# Patient Record
(demographics unavailable — no encounter records)

---

## 2024-10-29 NOTE — PHYSICAL EXAM
[Alert] : alert [No Acute Distress] : no acute distress [Normocephalic] : normocephalic [Conjunctivae with no discharge] : conjunctivae with no discharge [PERRL] : PERRL [EOMI Bilateral] : EOMI bilateral [Auricles Well Formed] : auricles well formed [Clear Tympanic membranes with present light reflex and bony landmarks] : clear tympanic membranes with present light reflex and bony landmarks [No Discharge] : no discharge [Nares Patent] : nares patent [Pink Nasal Mucosa] : pink nasal mucosa [Palate Intact] : palate intact [Supple, full passive range of motion] : supple, full passive range of motion [No Palpable Masses] : no palpable masses [Symmetric Chest Rise] : symmetric chest rise [Clear to Auscultation Bilaterally] : clear to auscultation bilaterally [Regular Rate and Rhythm] : regular rate and rhythm [Normal S1, S2 present] : normal S1, S2 present [No Murmurs] : no murmurs [+2 Femoral Pulses] : +2 femoral pulses [Soft] : soft [NonTender] : non tender [Non Distended] : non distended [Normoactive Bowel Sounds] : normoactive bowel sounds [No Hepatomegaly] : no hepatomegaly [No Splenomegaly] : no splenomegaly [Testicles Descended Bilaterally] : testicles descended bilaterally [No Abnormal Lymph Nodes Palpated] : no abnormal lymph nodes palpated [Normal Muscle Tone] : normal muscle tone [Straight] : straight [No Scoliosis] : no scoliosis [Cranial Nerves Grossly Intact] : cranial nerves grossly intact [No Rash or Lesions] : no rash or lesions [de-identified] : red throat

## 2024-10-29 NOTE — DISCUSSION/SUMMARY
[] : The components of the vaccine(s) to be administered today are listed in the plan of care. The disease(s) for which the vaccine(s) are intended to prevent and the risks have been discussed with the caretaker.  The risks are also included in the appropriate vaccination information statements which have been provided to the patient's caregiver.  The caregiver has given consent to vaccinate. [FreeTextEntry1] : Continue balanced diet with all food groups. Brush teeth twice a day with toothbrush. Recommend visit to dentist. Help child to maintain consistent daily routines and sleep schedule. School discussed. Ensure home is safe. Teach child about personal safety. Use consistent, positive discipline. Limit screen time to no more than 2 hours per day. Encourage physical activity. Child needs to ride in a belt-positioning booster seat until  4 feet 9 inches has been reached and are between 8 and 12 years of age.   Reviewed 5-2-1-0 questionnaire Cardiology questionnaire reviewed Return 1 year for routine well child check. Throat cx if pos Amoxil  500 mg po  bid x 10 days Tylenol prn, fluids

## 2024-10-29 NOTE — HISTORY OF PRESENT ILLNESS
[Mother] : mother [Normal] : Normal [Yes] : Patient goes to dentist yearly [Toothpaste] : Primary Fluoride Source: Toothpaste [Grade ___] : Grade [unfilled] [No] : No cigarette smoke exposure [Up to date] : Up to date [FreeTextEntry7] : 8 year well visit.  ST x 1 day.  Takes Singulair in the spring, Flonase prn [FreeTextEntry9] : lacrosse, basketball, hockey

## 2025-03-14 NOTE — HISTORY OF PRESENT ILLNESS
[de-identified] : Mom reports sore throat x today at school. Afebrile per mom. Normal appetite and voiding. Denies vomiting or diarrhea. Denies OTC today. [FreeTextEntry6] : Just started getting runny nose today Denies cough

## 2025-03-14 NOTE — PLAN
[TextEntry] : ALLERGIC RHINITIS: - Recommended treatment: Zyrtec (Cetirizine) 5-10 mg once a day Flonase (Fluticasone) 1 spray(s) each nostril 1 time(s) a day - Follow up if symptoms persist or worsen  If throat cx + start amoxicillin 400/5 at 6.5mL BID x 10 days

## 2025-03-14 NOTE — PHYSICAL EXAM
[TextEntry] : General: alert and active in no apparent distress Eyes: conjunctiva clear, EOMI Ears: TMs translucent bilaterally, normal landmarks noted, normal canals Nose: clear rhinorrhea with BL pale, boggy turbinates, L>R OP: no tonsillar enlargement/exudate, no lesions, no posterior pharyngeal erythema Neck: supple, no adenopathy Lungs: clear to auscultation bilaterally, good air exchange, no retractions, comfortable WOB CVS: Normal rate, regular rhythm, no murmur Abdomen: soft, nondistended, nontender, and no hepatosplenomegaly or masses, normoactive bowel sounds Skin: No rashes, lesions or skin changes

## 2025-03-31 NOTE — PLAN
4 = No assist / stand by assistance
[TextEntry] : left acute otitis media, recommend amoxicillin 10ml BID x 10 days. finish full course of antibiotics, side effects reviewed.  encourage hydration and rest tylenol/motrin as needed for fevers and/or pain return to office in 2 weeks if no improvement or worsening symptoms

## 2025-03-31 NOTE — END OF VISIT
[FreeTextEntry3] : All medical record entries made by NP student Bertha Leblanc for this encounter were under the direction of myself. I was present with the entire encounter and have reviewed the chart and agree that the record accurately reflects my personal performance of the history, physical exam, assessment and plan. I have also personally directed, reviewed and agreed with the chart.

## 2025-03-31 NOTE — HISTORY OF PRESENT ILLNESS
[de-identified] : left ear pain that began last night as per dad, last dose of tylenol an hr ago [FreeTextEntry6] : left ear pain starting last night and right side of neck feeling swollen today headache this morning, tylenol given last at 11am no fevers, no difficulty swallowing able to tolerate PO, no nausea/vomiting no cough or congestion no recent sick contacts

## 2025-03-31 NOTE — PHYSICAL EXAM
[Clear] : right tympanic membrane clear [Erythematous Oropharynx] : erythematous oropharynx [Symmetric Chest Wall] : symmetric chest wall [NL] : warm, clear [Anterior Cervical] : anterior cervical [Submandibular] : submandibular [Tenderness] : no tenderness [Traumatic] : atraumatic [Vesicles] : no vesicles [Exudate] : no exudate [Palate petechiae] : palate without petechiae [Tenderness With Palpation of Chest Wall] : no tenderness with palpation of chest wall [FreeTextEntry3] : dull, erythematous left tympanic membrane [de-identified] : mildly enlarged

## 2025-04-29 NOTE — PHYSICAL EXAM
[TextEntry] : General: alert and active in no apparent distress Eyes: conjunctiva clear, EOMI Ears: left TM erythematous, dull, right TM translucent, normal landmarks noted, normal canals Nose: clear rhinorrhea OP: no tonsillar enlargement/exudate, no lesions, no posterior pharyngeal erythema Neck: supple, no adenopathy Lungs: clear to auscultation bilaterally, good air exchange, no retractions, comfortable WOB CVS: Normal rate, regular rhythm, no murmur Skin: No rashes, lesions or skin changes

## 2025-04-29 NOTE — HISTORY OF PRESENT ILLNESS
[de-identified] : 8yr old m c/o left ear ache runny nose on Zyrtec  [FreeTextEntry6] : In addition to above: left ear pain since last PM + runny nose and headache Denies fever, cough, ST, N/V/D Appetite nml

## 2025-04-29 NOTE — CARE PLAN
[Care Plan reviewed and provided to patient/caregiver] : Care plan reviewed and provided to patient/caregiver [Care Plan reviewed every ___ weeks] : Care plan reviewed every [unfilled] weeks [Care Plan managed/Care coordinated by: ___] : Care plan managed/Care coordinated by: [unfilled] [Understands and communicates without difficulty] : Patient/Caregiver understands and communicates without difficulty [FreeTextEntry2] : see plan [FreeTextEntry3] : see plan [place] Counseling: I discussed with the patient the risks of tildrakizumab including but not limited to immunosuppression, malignancy, posterior leukoencephalopathy syndrome, and serious infections.  The patient understands that monitoring is required including a PPD at baseline and must alert us or the primary physician if symptoms of infection or other concerning signs are noted.

## 2025-04-29 NOTE — PLAN
[TextEntry] :  OTITIS MEDIA: - Treat with medication per order - Symptomatic treatment with acetaminophen or ibuprofen prn - Continue Zyrtec and Flonase  - Follow up if symptoms are worsening

## 2025-05-12 NOTE — PHYSICAL EXAM
[NL] : warm, clear [Erythema] : no erythema [de-identified] : staple intact right side of scalp above right ear, no edema or discharge at site

## 2025-05-12 NOTE — REVIEW OF SYSTEMS
[Nasal Discharge] : nasal discharge [Cough] : cough [Negative] : Genitourinary [Fever] : no fever [Ear Pain] : no ear pain [Tachypnea] : not tachypneic [Wheezing] : no wheezing [Shortness of Breath] : no shortness of breath

## 2025-05-12 NOTE — PHYSICAL EXAM
[NL] : warm, clear [Erythema] : no erythema [de-identified] : staple intact right side of scalp above right ear, no edema or discharge at site

## 2025-05-12 NOTE — HISTORY OF PRESENT ILLNESS
[de-identified] :  [facility]25 and received a staple on side of head and per [facility] said he had yellow fluid in left ear. mom denies any other symptoms [FreeTextEntry6] : Seen at [facility] 3 days ago after sustaining laceration on right side of head requiring 1 staple On exam, mother was told the left ear had yellow fluid and to follow up with PM[facility] Completed 10 day course of Cefdinir 4 days ago for left OM diagnosed when seen for ear pain on 4/29/25 Afebrile. Has cough and runny nose due to seasonal allergies Taking Flonase and Zyrtec daily No ear pain No nausea, vomiting, diarrhea Eating, drinking and urinating well

## 2025-05-12 NOTE — HISTORY OF PRESENT ILLNESS
[de-identified] :  [facility]25 and received a staple on side of head and per [facility] said he had yellow fluid in left ear. mom denies any other symptoms [FreeTextEntry6] : Seen at [facility] 3 days ago after sustaining laceration on right side of head requiring 1 staple On exam, mother was told the left ear had yellow fluid and to follow up with PM[facility] Completed 10 day course of Cefdinir 4 days ago for left OM diagnosed when seen for ear pain on 4/29/25 Afebrile. Has cough and runny nose due to seasonal allergies Taking Flonase and Zyrtec daily No ear pain No nausea, vomiting, diarrhea Eating, drinking and urinating well

## 2025-05-19 NOTE — HISTORY OF PRESENT ILLNESS
[de-identified] : left ear pain started in am per mom pt has hx of left OM  [FreeTextEntry6] : LOM on 3/31/25, 4/29/25 - cleared with amoxicillin and then cefdinir  no fever congestion, no cough - on Zyrtec and Flonase no vomiting, no diarrhea normal appetite

## 2025-05-19 NOTE — PLAN
[TextEntry] : symptomatic treatment fluids intake handwashing and infection control discussed recheck in 2 weeks, sooner if worse/not improving follow up with ENT